# Patient Record
Sex: FEMALE | Race: WHITE | URBAN - METROPOLITAN AREA
[De-identification: names, ages, dates, MRNs, and addresses within clinical notes are randomized per-mention and may not be internally consistent; named-entity substitution may affect disease eponyms.]

---

## 2018-11-05 ENCOUNTER — HOSPITAL ENCOUNTER (OUTPATIENT)
Age: 28
Discharge: HOME | End: 2018-11-07
Payer: COMMERCIAL

## 2018-11-05 PROCEDURE — 87591 N.GONORRHOEAE DNA AMP PROB: CPT

## 2018-11-05 PROCEDURE — G0123 SCREEN CERV/VAG THIN LAYER: HCPCS

## 2018-11-05 PROCEDURE — 87088 URINE BACTERIA CULTURE: CPT

## 2018-11-05 PROCEDURE — 87491 CHLMYD TRACH DNA AMP PROBE: CPT

## 2018-11-05 PROCEDURE — 81001 URINALYSIS AUTO W/SCOPE: CPT

## 2018-11-06 LAB
BACTERIA: ABNORMAL /HPF
BILIRUBIN URINE: NEGATIVE
BLOOD, URINE: ABNORMAL
CLARITY: CLEAR
COLOR: YELLOW
EPITHELIAL CELLS, UA: ABNORMAL /HPF
GLUCOSE URINE: NEGATIVE MG/DL
KETONES, URINE: NEGATIVE MG/DL
LEUKOCYTE ESTERASE, URINE: NEGATIVE
NITRITE, URINE: NEGATIVE
PH UA: 6.5 (ref 5–9)
PROTEIN UA: NEGATIVE MG/DL
RBC UA: ABNORMAL /HPF (ref 0–2)
SPECIFIC GRAVITY UA: 1.02 (ref 1–1.03)
UROBILINOGEN, URINE: 0.2 E.U./DL
WBC UA: ABNORMAL /HPF (ref 0–5)

## 2018-11-07 LAB — URINE CULTURE, ROUTINE: NORMAL

## 2018-11-12 LAB
CHLAMYDIA TRACHOMATIS AMPLIFIED DET: NORMAL
N GONORRHOEAE AMPLIFIED DET: NORMAL

## 2025-03-21 ENCOUNTER — TELEPHONE (OUTPATIENT)
Age: 35
End: 2025-03-21

## 2025-03-21 NOTE — TELEPHONE ENCOUNTER
Yes lets send her tonight to a walk in clinic or tomorrow to [facility] walk in clinic they open at 0800

## 2025-03-21 NOTE — TELEPHONE ENCOUNTER
Patient called because she has been ill since Monday with losing her voice, yellow green nasal drainage, headache, drainage down throat & cough. She can feel some mucus in her chest but is not coughing anything up. She tested and is Covid negative.     She also has a painful lump under her left eye ( on the bottom eyelid) as of 3 hours ago.  She has taken Dayquil and Nyquil but not helping.     I cannot tell when she was here last, she is not our [place] lists but she is on old office list.   She wanted to know if she should be seen due to the lump. I explained we cannot see her today but she will go to walk in if you think she should.    NKDA but allergic to cucumber. Uses [facility] in [place]

## 2025-04-02 ENCOUNTER — HOSPITAL ENCOUNTER (EMERGENCY)
Age: 35
Discharge: HOME | End: 2025-04-02
Attending: EMERGENCY MEDICINE
Payer: COMMERCIAL

## 2025-04-02 VITALS
HEIGHT: 67 IN | DIASTOLIC BLOOD PRESSURE: 66 MMHG | WEIGHT: 172 LBS | OXYGEN SATURATION: 98 % | HEART RATE: 85 BPM | SYSTOLIC BLOOD PRESSURE: 111 MMHG | BODY MASS INDEX: 27 KG/M2 | TEMPERATURE: 97.5 F | RESPIRATION RATE: 17 BRPM

## 2025-04-02 DIAGNOSIS — R21 RASH: Primary | ICD-10-CM

## 2025-04-02 LAB — HETEROPH AB BLD QL IA: NEGATIVE

## 2025-04-02 PROCEDURE — 2500000003 HC RX 250 WO HCPCS: Performed by: EMERGENCY MEDICINE

## 2025-04-02 PROCEDURE — 6360000002 HC RX W HCPCS: Performed by: EMERGENCY MEDICINE

## 2025-04-02 PROCEDURE — 96374 THER/PROPH/DIAG INJ IV PUSH: CPT

## 2025-04-02 PROCEDURE — 99283 EMERGENCY DEPT VISIT LOW MDM: CPT

## 2025-04-02 PROCEDURE — 86308 HETEROPHILE ANTIBODY SCREEN: CPT

## 2025-04-02 PROCEDURE — 96375 TX/PRO/DX INJ NEW DRUG ADDON: CPT

## 2025-04-02 RX ORDER — DIPHENHYDRAMINE HYDROCHLORIDE 50 MG/ML
25 INJECTION, SOLUTION INTRAMUSCULAR; INTRAVENOUS ONCE
Status: COMPLETED | OUTPATIENT
Start: 2025-04-02 | End: 2025-04-02

## 2025-04-02 RX ORDER — PREDNISONE 20 MG/1
TABLET ORAL
Qty: 10 TABLET | Refills: 0 | Status: SHIPPED | OUTPATIENT
Start: 2025-04-02

## 2025-04-02 RX ORDER — DIPHENHYDRAMINE HCL 25 MG
25 TABLET ORAL EVERY 6 HOURS PRN
Status: DISCONTINUED | OUTPATIENT
Start: 2025-04-02 | End: 2025-04-02

## 2025-04-02 RX ORDER — DEXAMETHASONE SODIUM PHOSPHATE 10 MG/ML
10 INJECTION, SOLUTION INTRAMUSCULAR; INTRAVENOUS ONCE
Status: DISCONTINUED | OUTPATIENT
Start: 2025-04-02 | End: 2025-04-02

## 2025-04-02 RX ADMIN — WATER 125 MG: 1 INJECTION INTRAMUSCULAR; INTRAVENOUS; SUBCUTANEOUS at 06:27

## 2025-04-02 RX ADMIN — DIPHENHYDRAMINE HYDROCHLORIDE 25 MG: 50 INJECTION INTRAMUSCULAR; INTRAVENOUS at 06:27

## 2025-04-02 ASSESSMENT — LIFESTYLE VARIABLES: HOW OFTEN DO YOU HAVE A DRINK CONTAINING ALCOHOL: NEVER

## 2025-04-02 ASSESSMENT — PAIN SCALES - GENERAL
PAINLEVEL_OUTOF10: 0
PAINLEVEL_OUTOF10: 0

## 2025-04-02 NOTE — ED PROVIDER NOTES
SOCIAL HISTORY       Social History     Tobacco Use    Smoking status: Never    Smokeless tobacco: Never   Substance Use Topics    Alcohol use: No    Drug use: No       SCREENINGS        [place] Coma Scale  Eye Opening: Spontaneous  Best Verbal Response: Oriented  Best Motor Response: Obeys commands  [place] Coma Scale Score: 15                CIWA Assessment  BP: 111/66  Pulse: 85           PHYSICAL EXAM  1 or more Elements     ED Triage Vitals [04/02/25 0602]   BP Systolic BP Percentile Diastolic BP Percentile Temp Temp Source Pulse Respirations SpO2   (!) 158/91 -- -- 97.5 °F (36.4 °C) Temporal (!) 107 17 100 %      Height Weight - Scale         1.702 m (5' 7\") 78 kg (172 lb)             Physical Exam  Constitutional:       General: She is not in acute distress.     Appearance: Normal appearance. She is not ill-appearing.   HENT:      Head: Normocephalic.      Mouth/Throat:      Mouth: Mucous membranes are moist.   Eyes:      Conjunctiva/sclera: Conjunctivae normal.      Pupils: Pupils are equal, round, and reactive to light.   Cardiovascular:      Rate and Rhythm: Normal rate and regular rhythm.      Pulses: Normal pulses.   Pulmonary:      Effort: Pulmonary effort is normal. No respiratory distress.      Breath sounds: Normal breath sounds. No stridor. No wheezing or rales.   Abdominal:      General: There is no distension.      Palpations: Abdomen is soft.      Tenderness: There is no abdominal tenderness. There is no guarding.   Musculoskeletal:      Cervical back: Normal range of motion and neck supple.   Skin:     Findings: No erythema.      Comments: Diffuse raised papular rash on chest abdomen and extremities  No purulent drainage  No pustules, vesicles or petechiae         DIAGNOSTIC RESULTS   LABS:    Labs Reviewed   MONONUCLEOSIS SCREEN       When ordered only abnormal lab results are displayed. All other labs were within normal range or not returned as of this dictation.      RADIOLOGY:

## 2025-04-03 ENCOUNTER — HOSPITAL ENCOUNTER (EMERGENCY)
Age: 35
Discharge: HOME | End: 2025-04-03
Attending: EMERGENCY MEDICINE
Payer: COMMERCIAL

## 2025-04-03 VITALS
HEART RATE: 72 BPM | DIASTOLIC BLOOD PRESSURE: 77 MMHG | TEMPERATURE: 97.6 F | OXYGEN SATURATION: 100 % | HEIGHT: 67 IN | RESPIRATION RATE: 17 BRPM | SYSTOLIC BLOOD PRESSURE: 139 MMHG | BODY MASS INDEX: 26.68 KG/M2 | WEIGHT: 170 LBS

## 2025-04-03 DIAGNOSIS — T36.0X5A AMOXICILLIN RASH: Primary | ICD-10-CM

## 2025-04-03 DIAGNOSIS — L27.0 AMOXICILLIN RASH: Primary | ICD-10-CM

## 2025-04-03 PROCEDURE — 99282 EMERGENCY DEPT VISIT SF MDM: CPT

## 2025-04-03 ASSESSMENT — LIFESTYLE VARIABLES
HOW OFTEN DO YOU HAVE A DRINK CONTAINING ALCOHOL: NEVER
HOW MANY STANDARD DRINKS CONTAINING ALCOHOL DO YOU HAVE ON A TYPICAL DAY: PATIENT DOES NOT DRINK

## 2025-04-03 NOTE — ED PROVIDER NOTES
HPI:  4/3/25,   Time: 7:50 AM EDT         Kathryn Machado is a 34 y.o. female presenting to the ED for evaluation of a rash.  Patient states that approximately 1-1/2 weeks ago she presented to urgent care with nasal congestion, laryngitis and sore throat.  She was placed on a Z-Tevin and amoxicillin at the same time.  She states she finished the antibiotics but within the course of the antibiotic she felt better within 2 or 3 days.  Approximately 1 or 2 days after completing the amoxicillin, she developed a body wide red rash.  She denies feeling ill otherwise.  The rash gets flushed and redder at certain times throughout the day.  She was getting steroid injections and then started taking prednisone on April 1.  She continues to take Xyzal at home.    ROS:   Pertinent positives and negatives are stated within HPI, all other systems reviewed and are negative.  --------------------------------------------- PAST HISTORY ---------------------------------------------  Past Medical History:  has a past medical history of Abnormal glandular Papanicolaou smear of cervix.    Past Surgical History:  has a past surgical history that includes Colposcopy.    Social History:  reports that she has never smoked. She has never used smokeless tobacco. She reports that she does not drink alcohol and does not use drugs.    Family History: family history includes Cancer in her maternal grandmother; Heart Defect in her father.     The patient’s home medications have been reviewed.    Allergies: Cucumber extract and Amoxicillin    -------------------------------------------------- RESULTS -------------------------------------------------  All laboratory and radiology results have been personally reviewed by myself   LABS:  No results found for this visit on 04/03/25.    RADIOLOGY:  Interpreted by Radiologist.  No orders to display       ------------------------- NURSING NOTES AND VITALS REVIEWED ---------------------------   The

## 2025-04-03 NOTE — DISCHARGE INSTRUCTIONS
Your rash is following a normal course.  It is most likely due to your body's reaction to the amoxicillin.  Continue to take the steroid as prescribed.  Continue to take your antihistamine as you have been at home.  The course of the rash can take anywhere from a few days to a few weeks to resolve.